# Patient Record
Sex: FEMALE | Race: WHITE | NOT HISPANIC OR LATINO | URBAN - METROPOLITAN AREA
[De-identification: names, ages, dates, MRNs, and addresses within clinical notes are randomized per-mention and may not be internally consistent; named-entity substitution may affect disease eponyms.]

---

## 2023-04-27 ENCOUNTER — ON CAMPUS - OUTPATIENT (OUTPATIENT)
Dept: URBAN - METROPOLITAN AREA HOSPITAL 131 | Facility: HOSPITAL | Age: 83
End: 2023-04-27
Payer: MEDICARE

## 2023-04-27 ENCOUNTER — INPATIENT HOSPITAL (OUTPATIENT)
Dept: URBAN - METROPOLITAN AREA HOSPITAL 130 | Facility: HOSPITAL | Age: 83
End: 2023-04-27
Payer: MEDICARE

## 2023-04-27 DIAGNOSIS — R11.10 VOMITING, UNSPECIFIED: ICD-10-CM

## 2023-04-27 DIAGNOSIS — K44.9 DIAPHRAGMATIC HERNIA WITHOUT OBSTRUCTION OR GANGRENE: ICD-10-CM

## 2023-04-27 PROCEDURE — 99222 1ST HOSP IP/OBS MODERATE 55: CPT | Performed by: INTERNAL MEDICINE

## 2023-04-27 PROCEDURE — 99214 OFFICE O/P EST MOD 30 MIN: CPT | Performed by: INTERNAL MEDICINE

## 2023-06-06 ENCOUNTER — OFFICE (OUTPATIENT)
Dept: URBAN - METROPOLITAN AREA CLINIC 19 | Facility: CLINIC | Age: 83
End: 2023-06-06

## 2023-06-06 VITALS
SYSTOLIC BLOOD PRESSURE: 103 MMHG | OXYGEN SATURATION: 100 % | HEIGHT: 66 IN | WEIGHT: 166 LBS | HEART RATE: 54 BPM | DIASTOLIC BLOOD PRESSURE: 40 MMHG

## 2023-06-06 DIAGNOSIS — K44.9 DIAPHRAGMATIC HERNIA WITHOUT OBSTRUCTION OR GANGRENE: ICD-10-CM

## 2023-06-06 PROCEDURE — 99213 OFFICE O/P EST LOW 20 MIN: CPT | Performed by: INTERNAL MEDICINE

## 2023-07-18 ENCOUNTER — OFFICE (OUTPATIENT)
Dept: URBAN - METROPOLITAN AREA CLINIC 19 | Facility: CLINIC | Age: 83
End: 2023-07-18

## 2023-07-18 VITALS
SYSTOLIC BLOOD PRESSURE: 171 MMHG | DIASTOLIC BLOOD PRESSURE: 70 MMHG | WEIGHT: 171 LBS | HEART RATE: 54 BPM | OXYGEN SATURATION: 98 % | HEIGHT: 66 IN

## 2023-07-18 DIAGNOSIS — K44.9 DIAPHRAGMATIC HERNIA WITHOUT OBSTRUCTION OR GANGRENE: ICD-10-CM

## 2023-07-18 DIAGNOSIS — K21.9 GASTRO-ESOPHAGEAL REFLUX DISEASE WITHOUT ESOPHAGITIS: ICD-10-CM

## 2023-07-18 DIAGNOSIS — R10.11 RIGHT UPPER QUADRANT PAIN: ICD-10-CM

## 2023-07-18 DIAGNOSIS — M94.0 CHONDROCOSTAL JUNCTION SYNDROME [TIETZE]: ICD-10-CM

## 2023-07-18 PROCEDURE — 99214 OFFICE O/P EST MOD 30 MIN: CPT

## 2023-07-18 NOTE — SERVICEHPINOTES
83-year-old  white female previously seen by Phil Rose MD returns for complaints of postprandial RUQ pain over the last several days.
danika garnica   She was last seen 6/6/23 following a hospitalization for acute nausea and vomiting.  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  She was doing well as long as she was eating small meals throughout the day.  She was not having any subsequent abdominal pain her symptoms when she was seen in follow-up.
danika garnica   Over the last several days she has had abdominal pain after she eats.  She was on vacation with some friends and admittedly ate more than she likely should have.  This precipitated the symptoms that she is having.  The initial pain was "severe" and she consider going to the ER.  She has not had those symptoms in the last day or 2.  She had an ultrasound 7/17/23 that was unremarkable.  She was encouraged to follow-up with GI to make sure her gallbladder was not involved in this .  She is on famotidine 20 mg for reflux which she feels is appropriate.  She denies dysphagia, change in bowel habit or overt GI bleeding.  She has never had a colonoscopy in is not interested in having this done.  Family history is negative for colon neoplasm.